# Patient Record
Sex: MALE | Race: OTHER | NOT HISPANIC OR LATINO | URBAN - METROPOLITAN AREA
[De-identification: names, ages, dates, MRNs, and addresses within clinical notes are randomized per-mention and may not be internally consistent; named-entity substitution may affect disease eponyms.]

---

## 2024-02-18 ENCOUNTER — EMERGENCY (EMERGENCY)
Facility: HOSPITAL | Age: 34
LOS: 1 days | Discharge: HOME | End: 2024-02-18
Attending: EMERGENCY MEDICINE | Admitting: EMERGENCY MEDICINE
Payer: MEDICAID

## 2024-02-18 VITALS
OXYGEN SATURATION: 98 % | DIASTOLIC BLOOD PRESSURE: 99 MMHG | RESPIRATION RATE: 16 BRPM | HEART RATE: 119 BPM | SYSTOLIC BLOOD PRESSURE: 149 MMHG | TEMPERATURE: 98 F

## 2024-02-18 VITALS
DIASTOLIC BLOOD PRESSURE: 108 MMHG | OXYGEN SATURATION: 95 % | HEART RATE: 113 BPM | SYSTOLIC BLOOD PRESSURE: 164 MMHG | TEMPERATURE: 98 F | RESPIRATION RATE: 20 BRPM

## 2024-02-18 DIAGNOSIS — S51.011A LACERATION WITHOUT FOREIGN BODY OF RIGHT ELBOW, INITIAL ENCOUNTER: ICD-10-CM

## 2024-02-18 DIAGNOSIS — S01.111A LACERATION WITHOUT FOREIGN BODY OF RIGHT EYELID AND PERIOCULAR AREA, INITIAL ENCOUNTER: ICD-10-CM

## 2024-02-18 DIAGNOSIS — S52.121A DISPLACED FRACTURE OF HEAD OF RIGHT RADIUS, INITIAL ENCOUNTER FOR CLOSED FRACTURE: ICD-10-CM

## 2024-02-18 DIAGNOSIS — Z20.822 CONTACT WITH AND (SUSPECTED) EXPOSURE TO COVID-19: ICD-10-CM

## 2024-02-18 DIAGNOSIS — Z23 ENCOUNTER FOR IMMUNIZATION: ICD-10-CM

## 2024-02-18 DIAGNOSIS — S01.81XA LACERATION WITHOUT FOREIGN BODY OF OTHER PART OF HEAD, INITIAL ENCOUNTER: ICD-10-CM
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
## 2024-02-18 DIAGNOSIS — I45.10 UNSPECIFIED RIGHT BUNDLE-BRANCH BLOCK: ICD-10-CM

## 2024-02-18 DIAGNOSIS — S06.300A UNSPECIFIED FOCAL TRAUMATIC BRAIN INJURY WITHOUT LOSS OF CONSCIOUSNESS, INITIAL ENCOUNTER: ICD-10-CM
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
## 2024-02-18 LAB
ALBUMIN SERPL ELPH-MCNC: 4.1 G/DL — SIGNIFICANT CHANGE UP (ref 3.4–5)
ALP SERPL-CCNC: 67 U/L — SIGNIFICANT CHANGE UP (ref 40–120)
ALT FLD-CCNC: 24 U/L — SIGNIFICANT CHANGE UP (ref 12–42)
ANION GAP SERPL CALC-SCNC: 16 MMOL/L — SIGNIFICANT CHANGE UP (ref 9–16)
APTT BLD: 30.5 SEC — SIGNIFICANT CHANGE UP (ref 24.5–35.6)
AST SERPL-CCNC: 31 U/L — SIGNIFICANT CHANGE UP (ref 15–37)
BASOPHILS # BLD AUTO: 0.04 K/UL — SIGNIFICANT CHANGE UP (ref 0–0.2)
BASOPHILS NFR BLD AUTO: 0.4 % — SIGNIFICANT CHANGE UP (ref 0–2)
BILIRUB SERPL-MCNC: 0.3 MG/DL — SIGNIFICANT CHANGE UP (ref 0.2–1.2)
BUN SERPL-MCNC: 8 MG/DL — SIGNIFICANT CHANGE UP (ref 7–23)
CALCIUM SERPL-MCNC: 8.7 MG/DL — SIGNIFICANT CHANGE UP (ref 8.5–10.5)
CHLORIDE SERPL-SCNC: 104 MMOL/L — SIGNIFICANT CHANGE UP (ref 96–108)
CO2 SERPL-SCNC: 23 MMOL/L — SIGNIFICANT CHANGE UP (ref 22–31)
CREAT SERPL-MCNC: 0.55 MG/DL — SIGNIFICANT CHANGE UP (ref 0.5–1.3)
EGFR: 133 ML/MIN/1.73M2 — SIGNIFICANT CHANGE UP
EOSINOPHIL # BLD AUTO: 0 K/UL — SIGNIFICANT CHANGE UP (ref 0–0.5)
EOSINOPHIL NFR BLD AUTO: 0 % — SIGNIFICANT CHANGE UP (ref 0–6)
ETHANOL SERPL-MCNC: 364 MG/DL — HIGH
FLUAV AG NPH QL: SIGNIFICANT CHANGE UP
FLUBV AG NPH QL: SIGNIFICANT CHANGE UP
GLUCOSE SERPL-MCNC: 108 MG/DL — HIGH (ref 70–99)
HCT VFR BLD CALC: 42.5 % — SIGNIFICANT CHANGE UP (ref 39–50)
HGB BLD-MCNC: 14.1 G/DL — SIGNIFICANT CHANGE UP (ref 13–17)
IMM GRANULOCYTES NFR BLD AUTO: 0.4 % — SIGNIFICANT CHANGE UP (ref 0–0.9)
INR BLD: 0.98 — SIGNIFICANT CHANGE UP (ref 0.85–1.18)
LYMPHOCYTES # BLD AUTO: 1.23 K/UL — SIGNIFICANT CHANGE UP (ref 1–3.3)
LYMPHOCYTES # BLD AUTO: 11.8 % — LOW (ref 13–44)
MCHC RBC-ENTMCNC: 31.1 PG — SIGNIFICANT CHANGE UP (ref 27–34)
MCHC RBC-ENTMCNC: 33.2 GM/DL — SIGNIFICANT CHANGE UP (ref 32–36)
MCV RBC AUTO: 93.6 FL — SIGNIFICANT CHANGE UP (ref 80–100)
MONOCYTES # BLD AUTO: 0.48 K/UL — SIGNIFICANT CHANGE UP (ref 0–0.9)
MONOCYTES NFR BLD AUTO: 4.6 % — SIGNIFICANT CHANGE UP (ref 2–14)
NEUTROPHILS # BLD AUTO: 8.63 K/UL — HIGH (ref 1.8–7.4)
NEUTROPHILS NFR BLD AUTO: 82.8 % — HIGH (ref 43–77)
NRBC # BLD: 0 /100 WBCS — SIGNIFICANT CHANGE UP (ref 0–0)
PLATELET # BLD AUTO: 286 K/UL — SIGNIFICANT CHANGE UP (ref 150–400)
POTASSIUM SERPL-MCNC: 3.6 MMOL/L — SIGNIFICANT CHANGE UP (ref 3.5–5.3)
POTASSIUM SERPL-SCNC: 3.6 MMOL/L — SIGNIFICANT CHANGE UP (ref 3.5–5.3)
PROT SERPL-MCNC: 8.4 G/DL — HIGH (ref 6.4–8.2)
PROTHROM AB SERPL-ACNC: 10.8 SEC — SIGNIFICANT CHANGE UP (ref 9.5–13)
RBC # BLD: 4.54 M/UL — SIGNIFICANT CHANGE UP (ref 4.2–5.8)
RBC # FLD: 13.4 % — SIGNIFICANT CHANGE UP (ref 10.3–14.5)
RSV RNA NPH QL NAA+NON-PROBE: SIGNIFICANT CHANGE UP
SARS-COV-2 RNA SPEC QL NAA+PROBE: SIGNIFICANT CHANGE UP
SODIUM SERPL-SCNC: 143 MMOL/L — SIGNIFICANT CHANGE UP (ref 132–145)
WBC # BLD: 10.42 K/UL — SIGNIFICANT CHANGE UP (ref 3.8–10.5)
WBC # FLD AUTO: 10.42 K/UL — SIGNIFICANT CHANGE UP (ref 3.8–10.5)

## 2024-02-18 PROCEDURE — 72125 CT NECK SPINE W/O DYE: CPT | Mod: 26

## 2024-02-18 PROCEDURE — 74176 CT ABD & PELVIS W/O CONTRAST: CPT | Mod: 26

## 2024-02-18 PROCEDURE — 70486 CT MAXILLOFACIAL W/O DYE: CPT | Mod: 26

## 2024-02-18 PROCEDURE — 99285 EMERGENCY DEPT VISIT HI MDM: CPT | Mod: 25

## 2024-02-18 PROCEDURE — 70450 CT HEAD/BRAIN W/O DYE: CPT | Mod: 26

## 2024-02-18 PROCEDURE — 29125 APPL SHORT ARM SPLINT STATIC: CPT | Mod: RT

## 2024-02-18 PROCEDURE — 12014 RPR F/E/E/N/L/M 5.1-7.5 CM: CPT | Mod: 59

## 2024-02-18 PROCEDURE — 12001 RPR S/N/AX/GEN/TRNK 2.5CM/<: CPT | Mod: 59

## 2024-02-18 PROCEDURE — 71250 CT THORAX DX C-: CPT | Mod: 26

## 2024-02-18 PROCEDURE — 99053 MED SERV 10PM-8AM 24 HR FAC: CPT

## 2024-02-18 RX ORDER — CEFAZOLIN SODIUM 1 G
2000 VIAL (EA) INJECTION ONCE
Refills: 0 | Status: COMPLETED | OUTPATIENT
Start: 2024-02-18 | End: 2024-02-18

## 2024-02-18 RX ORDER — MIDAZOLAM HYDROCHLORIDE 1 MG/ML
5 INJECTION, SOLUTION INTRAMUSCULAR; INTRAVENOUS ONCE
Refills: 0 | Status: DISCONTINUED | OUTPATIENT
Start: 2024-02-18 | End: 2024-02-18

## 2024-02-18 RX ORDER — ACETAMINOPHEN 500 MG
975 TABLET ORAL ONCE
Refills: 0 | Status: COMPLETED | OUTPATIENT
Start: 2024-02-18 | End: 2024-02-18

## 2024-02-18 RX ORDER — TETANUS TOXOID, REDUCED DIPHTHERIA TOXOID AND ACELLULAR PERTUSSIS VACCINE, ADSORBED 5; 2.5; 8; 8; 2.5 [IU]/.5ML; [IU]/.5ML; UG/.5ML; UG/.5ML; UG/.5ML
0.5 SUSPENSION INTRAMUSCULAR ONCE
Refills: 0 | Status: COMPLETED | OUTPATIENT
Start: 2024-02-18 | End: 2024-02-18

## 2024-02-18 RX ADMIN — TETANUS TOXOID, REDUCED DIPHTHERIA TOXOID AND ACELLULAR PERTUSSIS VACCINE, ADSORBED 0.5 MILLILITER(S): 5; 2.5; 8; 8; 2.5 SUSPENSION INTRAMUSCULAR at 03:58

## 2024-02-18 RX ADMIN — Medication 975 MILLIGRAM(S): at 03:59

## 2024-02-18 RX ADMIN — MIDAZOLAM HYDROCHLORIDE 5 MILLIGRAM(S): 1 INJECTION, SOLUTION INTRAMUSCULAR; INTRAVENOUS at 05:52

## 2024-02-18 RX ADMIN — Medication 975 MILLIGRAM(S): at 04:45

## 2024-02-18 RX ADMIN — Medication 100 MILLIGRAM(S): at 04:53

## 2024-02-18 NOTE — ED ADULT TRIAGE NOTE - CHIEF COMPLAINT QUOTE
[place] pt[place] was [place] of [place] subway [place]. Pt. noted to have swollen right eye and laceration to the right eyebrow. Pt. admits to drinking alcohol and unable to provide much information in triage.

## 2024-02-18 NOTE — ED ADULT NURSE REASSESSMENT NOTE - NS ED NURSE REASSESS COMMENT FT1
Report received from previous RN. Assumed patient care at this time @0085.  Patient is alert to person, forgetful. lac noted to left eyebrow. Denies pain.  VS stable (see flowsheet).

## 2024-02-18 NOTE — ED ADULT NURSE NOTE - NSFALLRISKINTERV_ED_ALL_ED
Assistance OOB with selected safe patient handling equipment if applicable/Assistance with ambulation/Communicate fall risk and risk factors to all staff, patient, and family/Monitor gait and stability/Monitor for mental status changes and reorient to person, place, and time, as needed/Provide visual cue: yellow wristband, yellow gown, etc/Reinforce activity limits and safety measures with patient and family/Toileting schedule using arm’s reach rule for commode and bathroom/Use of alarms - bed, stretcher, chair and/or video monitoring/Call bell, personal items and telephone in reach/Instruct patient to call for assistance before getting out of bed/chair/stretcher/Non-slip footwear applied when patient is off stretcher/[place] to call system/Physically safe environment - no spills, clutter or unnecessary equipment/Purposeful Proactive Rounding/Room/bathroom lighting operational, light cord in reach

## 2024-02-18 NOTE — ED ADULT NURSE NOTE - OBJECTIVE STATEMENT
[place] pt. was found prone at the bottom of the subway stairs. Pt. noted to have swollen right eye and laceration to the right eyebrow. Pt. admits to drinking alcohol and unable to provide much information in triage. Pt with inappropriate speech, resting in stretcher with bed alarm on and both side rails up. Resp even and unlabored. No distress noted.

## 2024-02-18 NOTE — ED ADULT NURSE REASSESSMENT NOTE - NS ED NURSE REASSESS COMMENT FT1
pt getting more and more agitated, standing up, unable to be re-directed verbally. EDUAR [place] and Tamica PLATT at bedside.. pt states "I am not going to [facility]".  security to bedside. versed IM 5MG given.

## 2024-02-18 NOTE — ED ADULT NURSE REASSESSMENT NOTE - NS ED NURSE REASSESS COMMENT FT1
tele huddle completed with Chip Villatoro MD. Pt cleared to be transferred to [facility].

## 2024-02-18 NOTE — ED PROCEDURE NOTE - PROCEDURE ADDITIONAL DETAILS
wounds quickly loosely approximated to facilitate urgent transfer to [place] for trauma/NSG/Ortho eval

## 2024-02-18 NOTE — ED PROVIDER NOTE - OBJECTIVE STATEMENT
34 yo M with no known PMHx, last tetanus unknown, [place], Pt admits to drinking alcohol tonight, was trying to get home but reports waking up at the bottom of the subway stairs. Noted laceration to the R eyebrow and chin with R periorbital ecchymosis. Limited ROS due to pt's clinical condition. 32 yo M with no known PMHx, last tetanus unknown, [place], Pt admits to drinking alcohol tonight, was trying to get home but reports waking up at the bottom of the subway stairs. Noted lacerations to the R eyebrow and chin with R periorbital ecchymosis. Limited ROS due to pt's clinical condition.

## 2024-02-18 NOTE — ED PROVIDER NOTE - CARE PLAN
1 Principal Discharge DX:	Traumatic intracranial hemorrhage  Secondary Diagnosis:	Facial laceration  Secondary Diagnosis:	Multiple lacerations  Secondary Diagnosis:	Radial head fracture   Principal Discharge DX:	Traumatic intracranial hemorrhage  Secondary Diagnosis:	Facial laceration  Secondary Diagnosis:	Multiple lacerations  Secondary Diagnosis:	Radial head fracture  Secondary Diagnosis:	Fracture of right wrist or hand

## 2024-02-18 NOTE — ED PROVIDER NOTE - PHYSICAL EXAMINATION
Gen - WDWN M, AOB, lethargic but arousable by verbal stimuli  Skin - warm, dry, 3cm laceration to the R eyebrow, 4cm deep laceration to the R chin region, no bony or tendon exposure, no FB noted   HEENT - AT/NC, PERRL, mild conjunctival injection, pupils 3mm b/l, TM intact with no hemotympanium b/l, +R periorbital ecchymosis and ptosis, o/p clear, uvula midline, airway patent, neck supple with no step off or midline tenderness, FROM   CV - S1S2, R/R/R  Resp - respiration non-labored, CTAB, symmetric bs b/l, no r/r/w  GI - NABS, soft, ND, NT, no rebound or guarding, no CVAT b/l  MS - moving all extremities, no c/c/e  Neuro - Lethargic but arousable by verbal stimuli, slurred speech and unsteady gait Gen - WDWN M, AOB, lethargic but arousable by verbal stimuli  Skin - warm, dry, 3cm laceration to the R eyebrow, 4cm deep laceration to the R chin region, no bony or tendon exposure, no FB noted   HEENT - AT/NC, PERRL, mild conjunctival injection, pupils 3mm b/l, TM intact with no hemotympanium b/l, +R periorbital ecchymosis and ptosis, o/p clear, uvula midline, airway patent, neck supple with no step off or midline tenderness, FROM   CV - S1S2, R/R/R  Resp - respiration non-labored, CTAB, symmetric bs b/l, no r/r/w  GI - NABS, soft, ND, NT, no rebound or guarding, no CVAT b/l  MS - moving all extremities, mild edema to the R elbow region, NT, FROM, symmetric pulses b/l compartment soft  Neuro - Lethargic but arousable by verbal stimuli, slurred speech and unsteady gait Gen - WDWN M, AOB, lethargic but arousable by verbal stimuli  Skin - warm, dry, 3cm laceration to the R eyebrow, 4cm deep laceration to the R chin region, 1cm laceration to the R elbow, no bony or tendon exposure, no FB noted   HEENT - AT/NC, PERRL, mild conjunctival injection, pupils 3mm b/l, TM intact with no hemotympanium b/l, +R periorbital ecchymosis and ptosis, o/p clear, uvula midline, airway patent, neck supple with no step off or midline tenderness, FROM   CV - S1S2, R/R/R  Resp - respiration non-labored, CTAB, symmetric bs b/l, no r/r/w  GI - NABS, soft, ND, NT, no rebound or guarding, no CVAT b/l  MS - moving all extremities, mild edema to the R elbow region and TTP over distal R wrist, NT, FROM, symmetric pulses b/l compartment soft  Neuro - Lethargic but arousable by verbal stimuli, slurred speech and unsteady gait

## 2024-02-18 NOTE — ED PROVIDER NOTE - PROGRESS NOTE DETAILS
upon transfer and EMS arrival, pt became belligerent and got out of bed attempting to leave ER despite multiple attempts by ER staff re-emphasizing the urgent need for transfer to trauma center for further evaluation and monitoring due to traumatic ICH and multiple injuries/fx. Pt BAL markedly elevated at 364, clinically intoxicated and unsteady with flight risk. Attempted to re-direct multiple times by providers with no success and pt ripped out his IV and perseverating with incoherent thought/impaired judgement. Proceeded to chemical sedation with IM versed for safe transfer to [place] due to lack of clinical capability / impaired judgement at current time to sign out AMA. Pt's clinical status and updates provided to receiving team at [place] via CTC and to the charge RN

## 2024-02-18 NOTE — ED PROVIDER NOTE - CLINICAL SUMMARY MEDICAL DECISION MAKING FREE TEXT BOX
32 yo M with no known PMHx, last tetanus unknown, [place] and found on the bottom of the subway stairs. +heavy alcohol consumption. +multiple facial lacerations, +R periorbital ecchymosis with ptosis, mild R elbow swelling. trauma protocol performed and pt noted to have mild b/l frontal hemorrhage with no shift, +radial head fx. Wound quickly approximated at bedside with running prolene sutures and staples to facilitate rapid trasnfer to trauma center. GCS of 12, protecting airway, following commands intermittently but also found to be severely intoxicated. Given dose of IV ancef, tetanus, case discussed with [place] trauma attending, accepted for ER-ER transfer for further management and evaluation 32 yo M with no known PMHx, last tetanus unknown, [place] and found on the bottom of the subway stairs. +heavy alcohol consumption. +multiple facial lacerations, +R periorbital ecchymosis with ptosis, mild R elbow swelling. trauma protocol performed and pt noted to have mild b/l frontal hemorrhage with no shift, +radial head fx. Wound quickly approximated at bedside with running prolene sutures and staples to facilitate rapid trasnfer to trauma center. GCS of 12, protecting airway, following commands intermittently but also found to be severely intoxicated. Given dose of IV ancef, tetanus, case discussed with [place] trauma attending - Dr. Cain, accepted for ER-ER transfer for further management and evaluation. Signed out to [facility] ER attending Dr. Gromberg 34 yo M with no known PMHx, last tetanus unknown, [place] and found on the bottom of the subway stairs. +heavy alcohol consumption. +multiple facial lacerations and R elbow lac, +R periorbital ecchymosis with ptosis, mild R elbow swelling. trauma protocol performed and pt noted to have mild b/l frontal hemorrhage with no shift, +radial head fx. Wound quickly approximated at bedside with running prolene sutures and staples to facilitate rapid trasnfer to trauma center. GCS of 12, protecting airway, following commands intermittently but also found to be severely intoxicated. Given dose of IV ancef, tetanus, case discussed with [place] trauma attending - Dr. Cain, accepted for ER-ER transfer for further management and evaluation. Signed out to [facility] ER attending Dr. Gromberg

## 2024-05-07 NOTE — ED PROCEDURE NOTE - PROCEDURE DATE TIME, MLM
18-Feb-2024 05:27
18-Feb-2024 04:46
If an upper endoscopy or enteroscopy was performed, you might have a sore throat for 1 to 2 days after the procedure. If it does not improve, please contact your doctor.